# Patient Record
Sex: FEMALE | Race: BLACK OR AFRICAN AMERICAN | NOT HISPANIC OR LATINO | URBAN - METROPOLITAN AREA
[De-identification: names, ages, dates, MRNs, and addresses within clinical notes are randomized per-mention and may not be internally consistent; named-entity substitution may affect disease eponyms.]

---

## 2018-07-24 PROBLEM — R10.2 PELVIC PAIN: Status: ACTIVE | Noted: 2018-07-24

## 2024-03-05 ENCOUNTER — OFFICE VISIT (OUTPATIENT)
Dept: UROLOGY | Facility: CLINIC | Age: 49
End: 2024-03-05
Payer: COMMERCIAL

## 2024-03-05 VITALS — BODY MASS INDEX: 29 KG/M2 | RESPIRATION RATE: 18 BRPM | HEIGHT: 67 IN | WEIGHT: 184.75 LBS

## 2024-03-05 DIAGNOSIS — K59.09 CHRONIC CONSTIPATION: ICD-10-CM

## 2024-03-05 DIAGNOSIS — R39.9 LOWER URINARY TRACT SYMPTOMS (LUTS): ICD-10-CM

## 2024-03-05 DIAGNOSIS — N30.10 INTERSTITIAL CYSTITIS: ICD-10-CM

## 2024-03-05 DIAGNOSIS — N39.0 RECURRENT UTI: Primary | ICD-10-CM

## 2024-03-05 LAB
BILIRUB UR QL STRIP: NEGATIVE
GLUCOSE UR QL STRIP: NEGATIVE
KETONES UR QL STRIP: NEGATIVE
LEUKOCYTE ESTERASE UR QL STRIP: POSITIVE
PH, POC UA: 6
POC BLOOD, URINE: POSITIVE
POC NITRATES, URINE: NEGATIVE
PROT UR QL STRIP: POSITIVE
SP GR UR STRIP: 1.02 (ref 1–1.03)
UROBILINOGEN UR STRIP-ACNC: 1 (ref 0.1–1.1)

## 2024-03-05 PROCEDURE — 99999 PR PBB SHADOW E&M-NEW PATIENT-LVL III: CPT | Mod: PBBFAC,,, | Performed by: UROLOGY

## 2024-03-05 PROCEDURE — 81003 URINALYSIS AUTO W/O SCOPE: CPT | Mod: QW,S$GLB,, | Performed by: UROLOGY

## 2024-03-05 PROCEDURE — 99204 OFFICE O/P NEW MOD 45 MIN: CPT | Mod: S$GLB,,, | Performed by: UROLOGY

## 2024-03-05 RX ORDER — MIRABEGRON 50 MG/1
50 TABLET, FILM COATED, EXTENDED RELEASE ORAL DAILY
Qty: 30 TABLET | Refills: 11 | Status: SHIPPED | OUTPATIENT
Start: 2024-03-05 | End: 2024-03-14

## 2024-03-05 RX ORDER — AMITRIPTYLINE HYDROCHLORIDE 10 MG/1
10 TABLET, FILM COATED ORAL NIGHTLY
Qty: 30 TABLET | Refills: 11 | Status: SHIPPED | OUTPATIENT
Start: 2024-03-05 | End: 2025-03-05

## 2024-03-05 NOTE — PROGRESS NOTES
Chief Complaint   Patient presents with    Urinary Tract Infection         History of Present Illness:   Yoko Engel is a 48 y.o. female here for evaluation of Urinary Tract Infection  .   [birth year]49yo female, whom I last saw at [facility] in 2018 with h/o pelvic pain and IC, presents today for evaluation of UTI. She reports that folowing her hysterectomy in 2018, her UTIs  and IC symptoms seemed to resolve. She reports that she has been having bladder pain for the past month. She was seen in urgent care and she was put on abx at urgent care. She reports that she has had multiple rounds of antibiotics, but has not experienced any improvement. She has now been off of abx for about a week. She was given pyridium by her PCP. No gross hematuria. Not currently on any of her IC meds. She is having severe dysuria. She reports that she has incontinence during intercourse. She also reports pressure on her rectum. She reports that she has an intermittent stream.       Review of Systems   Constitutional:  Negative for fever.   Respiratory:  Negative for shortness of breath.    Cardiovascular:  Negative for chest pain.   Gastrointestinal:  Positive for constipation.   Genitourinary:  Positive for frequency.   Musculoskeletal:  Positive for arthralgias and back pain.   All other systems reviewed and are negative.        Past Medical History:   Diagnosis Date    Bladder infection     Interstitial cystitis     Neurogenic bladder     Prolapsed uterus     Urinary tract infection        Past Surgical History:   Procedure Laterality Date    BACK SURGERY      tumor removal from spine    BLADDER SURGERY      Bladder stimulator placement and removal    CYSTOSCOPY      KNEE ARTHROSCOPY W/ ACL RECONSTRUCTION Left     LAPAROSCOPIC LYSIS OF ADHESIONS N/A 7/24/2018    Procedure: LYSIS, ADHESIONS, LAPAROSCOPIC;  Surgeon: Carter Bermudez MD;  Location: [facility];  Service: OB/GYN;  Laterality: N/A;    LAPAROSCOPIC SALPINGECTOMY Bilateral  7/24/2018    Procedure: SALPINGECTOMY, LAPAROSCOPIC;  Surgeon: Carter Bermudez MD;  Location: [facility] OR;  Service: OB/GYN;  Laterality: Bilateral;    LAPAROSCOPIC TOTAL HYSTERECTOMY N/A 7/24/2018    Procedure: HYSTERECTOMY, TOTAL, LAPAROSCOPIC;  Surgeon: Carter Bermudez MD;  Location: [facility] OR;  Service: OB/GYN;  Laterality: N/A;       Family History   Problem Relation Age of Onset    Thyroid disease Mother     No Known Problems Father        Social History     Tobacco Use    Smoking status: Never    Smokeless tobacco: Never   Substance Use Topics    Alcohol use: No       Current Outpatient Medications   Medication Sig Dispense Refill    cyproheptadine (PERIACTIN) 4 mg tablet Take 4 mg by mouth 2 (two) times daily as needed. Take 2x per day      HYDROcodone-acetaminophen (NORCO) 7.5-325 mg per tablet Take 1 tablet by mouth every 4 (four) hours as needed for Pain (SURGICAL HYSTERECTOMY PAIN). 28 tablet 0    ibuprofen (ADVIL,MOTRIN) 800 MG tablet Take 800 mg by mouth every 6 (six) hours as needed for Pain.      nitrofurantoin, macrocrystal-monohydrate, (MACROBID) 100 MG capsule Take 100 mg by mouth every 12 (twelve) hours.      pantoprazole sodium (PROTONIX ORAL) Take 20 mg by mouth. Every morning      amitriptyline (ELAVIL) 10 MG tablet Take 1 tablet (10 mg total) by mouth every evening. 30 tablet 11    mirabegron (MYRBETRIQ) 50 mg Tb24 Take 1 tablet (50 mg total) by mouth Daily. 30 tablet 11     No current facility-administered medications for this visit.       Review of patient's allergies indicates:   Allergen Reactions    Ciprofloxacin Itching       Physical Exam  Vitals:    03/05/24 1301   Resp: 18     General: Well-developed, well-nourished, in no acute distress  HEENT: Normocephalic, atraumatic, extraocular movements intact  Neck: Supple, no supraclavicular or cervical lymphadenopathy, trachea midline  Respirations: even and unlabored  Back: midline spine, No CVA tenderness  Abdomen: soft, Non-tender,  non-distended, no palpable masses, no rebound or guarding  : Normal external female genitalia without lesions. Orthotopic urethral meatus. healthy vaginal mucosa. Grade 1 Cysotcele, Grade 1 Rectocele, negative cough stress test  Extremities: moves all equally, no clubbing, cyanosis or edema  Skin: Warm and dry. No lesions  Psych: normal affect  Neuro: Alert and oriented x 3. Cranial nerves II-XII intact    PVR: 55cc    Urinalysis  Trace blood, moderate LE, protein 100      Assessment:  1. Recurrent UTI  Urine culture    Ureaplasma PCR Urine    Mycoplasma genitalium Molecular Detection, PCR Urine      2. Lower urinary tract symptoms (LUTS)  POCT Urinalysis, Dipstick, Automated, W/O Scope      3. Interstitial cystitis        4. Chronic constipation              Plan:   Recurrent UTI  -     Urine culture  -     Ureaplasma PCR Urine; Future; Expected date: 03/05/2024  -     Mycoplasma genitalium Molecular Detection, PCR Urine; Future; Expected date: 03/05/2024    Lower urinary tract symptoms (LUTS)  -     POCT Urinalysis, Dipstick, Automated, W/O Scope    Interstitial cystitis    Chronic constipation    Other orders  -     amitriptyline (ELAVIL) 10 MG tablet; Take 1 tablet (10 mg total) by mouth every evening.  Dispense: 30 tablet; Refill: 11  -     mirabegron (MYRBETRIQ) 50 mg Tb24; Take 1 tablet (50 mg total) by mouth Daily.  Dispense: 30 tablet; Refill: 11      Constipation management    Follow up in about 4 weeks (around 4/2/2024).

## 2024-03-07 PROBLEM — E07.9 THYROID DISEASE: Status: ACTIVE | Noted: 2021-04-27

## 2024-03-07 PROBLEM — K31.84 GASTROPARESIS: Status: ACTIVE | Noted: 2018-08-02

## 2024-03-07 PROBLEM — M19.90 ARTHRITIS: Status: ACTIVE | Noted: 2024-01-11

## 2024-03-07 PROBLEM — I10 HYPERTENSION: Status: ACTIVE | Noted: 2018-08-01

## 2024-03-07 PROBLEM — M54.9 BACK PAIN: Status: ACTIVE | Noted: 2018-07-24

## 2024-03-07 PROBLEM — F41.9 ANXIETY: Status: ACTIVE | Noted: 2024-03-07

## 2024-03-11 ENCOUNTER — TELEPHONE (OUTPATIENT)
Dept: UROLOGY | Facility: CLINIC | Age: 49
End: 2024-03-11
Payer: COMMERCIAL

## 2024-03-11 DIAGNOSIS — N39.0 RECURRENT UTI: Primary | ICD-10-CM

## 2024-03-11 NOTE — TELEPHONE ENCOUNTER
03/11/2024 1547 - .Outgoing call placed to patient, patient verified name and date of birth, patient notified that she needs another urine sample to culture, patient stated that she has been having issues with her bladder and needs a PA for her Myrbetriq, patient also stated that she does not like taking the Elavil because it increases her pain and she feels like she has trouble emptying when she takes it. Message sent to Dr. Reeves with patient's concerns and order placed for urine culture as requested, patient will drop off sample on tomorrow. No further assistance needed.    Kg Brewer RN

## 2024-03-12 ENCOUNTER — LAB VISIT (OUTPATIENT)
Dept: LAB | Facility: HOSPITAL | Age: 49
End: 2024-03-12
Attending: UROLOGY
Payer: COMMERCIAL

## 2024-03-12 DIAGNOSIS — N39.0 RECURRENT UTI: ICD-10-CM

## 2024-03-12 PROCEDURE — 87088 URINE BACTERIA CULTURE: CPT | Performed by: UROLOGY

## 2024-03-12 PROCEDURE — 87086 URINE CULTURE/COLONY COUNT: CPT | Performed by: UROLOGY

## 2024-03-12 PROCEDURE — 87186 SC STD MICRODIL/AGAR DIL: CPT | Performed by: UROLOGY

## 2024-03-12 PROCEDURE — 87077 CULTURE AEROBIC IDENTIFY: CPT | Performed by: UROLOGY

## 2024-03-14 ENCOUNTER — TELEPHONE (OUTPATIENT)
Dept: UROLOGY | Facility: CLINIC | Age: 49
End: 2024-03-14
Payer: COMMERCIAL

## 2024-03-14 LAB — BACTERIA UR CULT: ABNORMAL

## 2024-03-14 RX ORDER — VIBEGRON 75 MG/1
75 TABLET, FILM COATED ORAL DAILY
Qty: 30 TABLET | Refills: 6 | Status: SHIPPED | OUTPATIENT
Start: 2024-03-14

## 2024-03-14 NOTE — TELEPHONE ENCOUNTER
----- Message from Cassidy Reeves MD sent at 3/14/2024 12:47 PM CDT -----  Gemtesa sent to her pharmacy. She may stop the elavil.   ----- Message -----  From: Kg Brewer, RN  Sent: 3/11/2024   3:50 PM CDT  To: Cassidy Reeves MD    Hey Dr. Reeves, I spoke with this patient and she will bring sample on tomorrow, would like like to include the mycoplasma/ureaplasma to it as well? And she stated that the Elavil makes her pain worse and she feels like she has trouble emptying her bladder so she does not like to take it, also states she needs a PA for her MYRBETRIQ.     ----- Message -----  From: Cassidy Reeves MD  Sent: 3/11/2024   8:07 AM CDT  To: [facility] GRIFFIN Staff    Looks like the urine culture didn't get sent from clinic. Please have her come in and do another one.   ----- Message -----  From: Anatoliy Ames MA  Sent: 3/8/2024   2:22 PM CDT  To: Cassidy Reeves MD    Pt c/o burning when urinating and is requesting Pyridium and abx. Pt also reports that she hasn't taken the Myrbetriq due to needing prior auth.    ----- Message -----  From: Ricarda Jordan  Sent: 3/8/2024  12:50 PM CST  To: [facility] GRIFFIN Staff    Type:  Test Results    Who Called: pt   Name of Test (Lab/Mammo/Etc): urinalysis   Date of Test: 03/05  Ordering Provider: Leandro   Where the test was performed: [facility]   Would the patient rather a call back or a response via [facility]? Call   Best Call Back Number:  [phone]  Additional Information:

## 2024-03-14 NOTE — TELEPHONE ENCOUNTER
03/14/2024 - 1012 - .Outgoing call placed to patient, patient verified name and date of birth, patient notified that Dr. Reeves sent over a alternative prescription GEMTESA as requested for her, patient verbalized understanding, no further assistance needed.    Kg Brewer RN

## 2024-03-14 NOTE — TELEPHONE ENCOUNTER
----- Message from Yoko Singh sent at 3/13/2024  2:05 PM CDT -----  Contact: Yoko Bowie was calling to check the status of the prior auth for the medication mirabegron (MYRBETRIQ) 50 mg Tb24. Please call her back at [phone].    Thanks  TS

## 2024-03-14 NOTE — TELEPHONE ENCOUNTER
Spoke with patient reference to medication. Patient states PA was denied do too alternative medication needed. Myrbetriq cost is 500$. Patient would like to try an alternative medication. Will notify Dr. Reeves.

## 2024-03-14 NOTE — TELEPHONE ENCOUNTER
Patient notified to stop taking medication Elavil due to side effects. Patient verbalized understanding that medication Gemtessa have been sent to local pharmacy and she can start taking.

## 2024-03-15 ENCOUNTER — TELEPHONE (OUTPATIENT)
Dept: UROLOGY | Facility: CLINIC | Age: 49
End: 2024-03-15
Payer: COMMERCIAL

## 2024-03-15 RX ORDER — CEFDINIR 300 MG/1
300 CAPSULE ORAL 2 TIMES DAILY
Qty: 20 CAPSULE | Refills: 0 | Status: SHIPPED | OUTPATIENT
Start: 2024-03-15 | End: 2024-03-25

## 2024-03-15 NOTE — TELEPHONE ENCOUNTER
----- Message from Cassidy Reeves MD sent at 3/15/2024  8:40 AM CDT -----  Notify pt cefdinir sent to her pharmacy for UTI.

## 2024-03-15 NOTE — TELEPHONE ENCOUNTER
Patient notified of positive urine culture results and that antibiotic Cefdinir have been sent to local pharmacy. Patient verbalized understanding.

## 2024-03-19 ENCOUNTER — TELEPHONE (OUTPATIENT)
Dept: UROLOGY | Facility: CLINIC | Age: 49
End: 2024-03-19
Payer: COMMERCIAL

## 2024-03-19 RX ORDER — OXYBUTYNIN CHLORIDE 10 MG/1
10 TABLET, EXTENDED RELEASE ORAL DAILY
Qty: 30 TABLET | Refills: 11 | Status: SHIPPED | OUTPATIENT
Start: 2024-03-19 | End: 2025-03-19

## 2024-03-19 NOTE — TELEPHONE ENCOUNTER
----- Message from Kg Brewer RN sent at 3/19/2024  4:46 PM CDT -----  Please advise.      ----- Message -----  From: Tiffanie Canales  Sent: 3/18/2024   2:52 PM CDT  To: [facility] GRIFFIN Staff    States she needs to get something else called in for her bladder. States the medication that was called in, is not covered on the list of medications. States they are not going to cover the Brand name only generic. Please call pt [phone]. Thank you

## 2024-04-11 ENCOUNTER — TELEPHONE (OUTPATIENT)
Dept: UROLOGY | Facility: CLINIC | Age: 49
End: 2024-04-11
Payer: COMMERCIAL

## 2024-04-11 DIAGNOSIS — N39.0 RECURRENT UTI: Primary | ICD-10-CM

## 2024-04-11 NOTE — TELEPHONE ENCOUNTER
.Outgoing call attempted to notify patient regarding her below message but no answer and voicemail box full so unable to leave a message.     Kg Brewer RN     ----- Message from Rosas Reeves sent at 4/11/2024  4:02 PM CDT -----  Regarding: pt advice  PATIENT CALL    Pt called regarding recurrent UTIs. States that she's having them back to back and the dysuria is getting worse.  Please call back at [phone], she states that the oxybutynin (DITROPAN-XL) 10 MG 24 hr tablet does not work for her.     #she is off work tomorrow and can submit a urine sample if necessary#

## 2024-06-04 ENCOUNTER — TELEPHONE (OUTPATIENT)
Dept: UROLOGY | Facility: CLINIC | Age: 49
End: 2024-06-04
Payer: COMMERCIAL

## 2024-06-04 NOTE — TELEPHONE ENCOUNTER
.Outgoing call attempted to notify patient regarding below but no answer, left voice message with contact information letting patient know she can contact us at her earliest convenience.      ----- Message from Toyin Horn MA sent at 6/4/2024 10:43 AM CDT -----  Regarding: schedule  Judith Saul ! Is there anyway you can squeeze this lady in to see Dr. Reeves for a UTI? I'm not able to book her until August-ish.    Thank you!

## 2025-07-23 ENCOUNTER — TELEPHONE (OUTPATIENT)
Dept: UROLOGY | Facility: CLINIC | Age: 50
End: 2025-07-23
Payer: COMMERCIAL

## 2025-07-23 NOTE — TELEPHONE ENCOUNTER
.Outgoing call attempted to contact patient regarding below but no answer, left voice message with contact information letting patient know she can contact us at her earliest convenience for assistance.      Copied from CRM #[id number]. Topic: Appointments - Appointment Scheduling  >> Jul 23, 2025 12:12 PM Karina wrote:  Type:  Patient Needing To Schedule   Who Called:Yoko   Patient Name: Yoko   What is the appointment for?: UTI  Preferred Location and Doctor: [facility]   Would the patient rather a call back or a response via [facility]?  Call back   Best Call Back Number: [phone] (home)    Additional Information:     Thanks,  SJ

## 2025-07-23 NOTE — TELEPHONE ENCOUNTER
.Outgoing call placed to patient, patient verified identifiers, patient stated she does not like the provider she was seeing at [facility] and needs another appointment with Dr. Reeves, notified of availability and appt scheduled as requested.      Copied from CRM #[id number]. Topic: General Inquiry - Return Call  >> Jul 23, 2025  3:00 PM Shahid wrote:  Type:  Patient Returning Call    Who Called:Yoko Engel   Who Left Message for Patient:Kg   Does the patient know what this is regarding?: her previous message   Would the patient rather a call back or a response via [facility]? call  Best Call Back Number:[phone]  Additional Information:

## 2025-08-19 ENCOUNTER — LAB VISIT (OUTPATIENT)
Dept: LAB | Facility: HOSPITAL | Age: 50
End: 2025-08-19
Attending: UROLOGY
Payer: MEDICAID

## 2025-08-19 DIAGNOSIS — N39.0 RECURRENT UTI: ICD-10-CM

## 2025-08-19 LAB
CREAT SERPL-MCNC: 0.7 MG/DL (ref 0.5–1.4)
GFR SERPLBLD CREATININE-BSD FMLA CKD-EPI: >60 ML/MIN/1.73/M2

## 2025-08-19 PROCEDURE — 36415 COLL VENOUS BLD VENIPUNCTURE: CPT | Mod: PN

## 2025-08-19 PROCEDURE — 82565 ASSAY OF CREATININE: CPT

## 2025-08-21 ENCOUNTER — TELEPHONE (OUTPATIENT)
Dept: UROLOGY | Facility: CLINIC | Age: 50
End: 2025-08-21
Payer: MEDICAID

## 2025-08-22 ENCOUNTER — TELEPHONE (OUTPATIENT)
Dept: UROLOGY | Facility: CLINIC | Age: 50
End: 2025-08-22
Payer: MEDICAID

## 2025-08-26 ENCOUNTER — TELEPHONE (OUTPATIENT)
Dept: UROLOGY | Facility: CLINIC | Age: 50
End: 2025-08-26
Payer: MEDICAID

## 2025-08-26 DIAGNOSIS — N39.0 RECURRENT UTI: Primary | ICD-10-CM

## 2025-08-28 ENCOUNTER — TELEPHONE (OUTPATIENT)
Dept: UROLOGY | Facility: CLINIC | Age: 50
End: 2025-08-28
Payer: MEDICAID

## 2025-09-04 ENCOUNTER — HOSPITAL ENCOUNTER (OUTPATIENT)
Dept: RADIOLOGY | Facility: HOSPITAL | Age: 50
Discharge: HOME | End: 2025-09-04
Attending: UROLOGY
Payer: MEDICAID

## 2025-09-04 DIAGNOSIS — N39.0 RECURRENT UTI: ICD-10-CM

## 2025-09-04 PROCEDURE — 76770 US EXAM ABDO BACK WALL COMP: CPT | Mod: 26,,, | Performed by: RADIOLOGY

## 2025-09-04 PROCEDURE — 76770 US EXAM ABDO BACK WALL COMP: CPT | Mod: TC,PN
